# Patient Record
Sex: FEMALE | Race: WHITE | NOT HISPANIC OR LATINO | URBAN - METROPOLITAN AREA
[De-identification: names, ages, dates, MRNs, and addresses within clinical notes are randomized per-mention and may not be internally consistent; named-entity substitution may affect disease eponyms.]

---

## 2024-04-09 ENCOUNTER — EMERGENCY (EMERGENCY)
Facility: HOSPITAL | Age: 74
LOS: 1 days | Discharge: HOME | End: 2024-04-09
Attending: EMERGENCY MEDICINE | Admitting: EMERGENCY MEDICINE
Payer: MEDICARE

## 2024-04-09 VITALS
RESPIRATION RATE: 16 BRPM | HEART RATE: 96 BPM | SYSTOLIC BLOOD PRESSURE: 138 MMHG | TEMPERATURE: 98 F | OXYGEN SATURATION: 97 % | DIASTOLIC BLOOD PRESSURE: 81 MMHG

## 2024-04-09 VITALS
OXYGEN SATURATION: 95 % | HEIGHT: 62 IN | WEIGHT: 119.93 LBS | HEART RATE: 119 BPM | RESPIRATION RATE: 18 BRPM | DIASTOLIC BLOOD PRESSURE: 85 MMHG | SYSTOLIC BLOOD PRESSURE: 147 MMHG | TEMPERATURE: 98 F

## 2024-04-09 PROCEDURE — 73110 X-RAY EXAM OF WRIST: CPT

## 2024-04-09 PROCEDURE — 99285 EMERGENCY DEPT VISIT HI MDM: CPT | Mod: 25

## 2024-04-09 PROCEDURE — 73110 X-RAY EXAM OF WRIST: CPT | Mod: 26,LT

## 2024-04-09 PROCEDURE — 99285 EMERGENCY DEPT VISIT HI MDM: CPT

## 2024-04-09 PROCEDURE — 73100 X-RAY EXAM OF WRIST: CPT

## 2024-04-09 PROCEDURE — 73090 X-RAY EXAM OF FOREARM: CPT

## 2024-04-09 PROCEDURE — 73110 X-RAY EXAM OF WRIST: CPT | Mod: 26,LT,77

## 2024-04-09 PROCEDURE — 73090 X-RAY EXAM OF FOREARM: CPT | Mod: 26,LT

## 2024-04-09 PROCEDURE — 25605 CLTX DST RDL FX/EPHYS SEP W/: CPT | Mod: LT

## 2024-04-09 RX ORDER — OXYCODONE HYDROCHLORIDE 5 MG/1
5 TABLET ORAL ONCE
Refills: 0 | Status: DISCONTINUED | OUTPATIENT
Start: 2024-04-09 | End: 2024-04-09

## 2024-04-09 RX ORDER — IBUPROFEN 200 MG
400 TABLET ORAL ONCE
Refills: 0 | Status: COMPLETED | OUTPATIENT
Start: 2024-04-09 | End: 2024-04-09

## 2024-04-09 RX ORDER — OXYCODONE HYDROCHLORIDE 5 MG/1
1 TABLET ORAL
Qty: 12 | Refills: 0
Start: 2024-04-09 | End: 2024-04-11

## 2024-04-09 RX ADMIN — Medication 400 MILLIGRAM(S): at 14:47

## 2024-04-09 RX ADMIN — OXYCODONE HYDROCHLORIDE 5 MILLIGRAM(S): 5 TABLET ORAL at 16:16

## 2024-04-09 NOTE — ED PROVIDER NOTE - NSFOLLOWUPINSTRUCTIONS_ED_ALL_ED_FT
Follow-up with orthopedics, call to make an appointment.  May take over-the-counter Tylenol 1000 mg orally every 4-6 hours as needed for pain.  May also take ibuprofen 400 mg every 4-6 hours as needed for pain.  If having severe pain may take oxycodone 5 mg every 6 hours.  Return to the emergency department if having severe pain, increased swelling and or worsening of symptoms.

## 2024-04-09 NOTE — ED PROVIDER NOTE - CARE PROVIDERS DIRECT ADDRESSES
negative - no change in level of consciousness [email or web address]

## 2024-04-09 NOTE — ED PROVIDER NOTE - PRINCIPAL DIAGNOSIS
NURSING DISCHARGE NOTE    Discharged Home via Wheelchair. Accompanied by Support staff  Belongings Taken by patient/family. Discharge education complete. All questions answered.  IV removed Left wrist fracture

## 2024-04-09 NOTE — CONSULT NOTE ADULT - SUBJECTIVE AND OBJECTIVE BOX
Pt Name: JUNG CHANEL    MRN: [id number]    HPI: Patient is a 74y Female with left distal radius fracture s/p slip and fall at home on Sunday. Patient denies HS, LOC, fevers, numbness, tingling, or any other orthopedic complaints. Patient is right hand dominant and ambulates independently.     PAST MEDICAL & SURGICAL HISTORY:      Allergies: No Known Allergies      PHYSICAL EXAM:    Vital Signs Last 24 Hrs  T(C): 36.7 (09 Apr 2024 13:37), Max: 36.7 (09 Apr 2024 13:37)  T(F): 98 (09 Apr 2024 13:37), Max: 98 (09 Apr 2024 13:37)  HR: 119 (09 Apr 2024 13:37) (119 - 119)  BP: 147/85 (09 Apr 2024 13:37) (147/85 - 147/85)  BP(mean): --  RR: 18 (09 Apr 2024 13:37) (18 - 18)  SpO2: 95% (09 Apr 2024 13:37) (95% - 95%)    Parameters below as of 09 Apr 2024 13:37  Patient On (Oxygen Delivery Method): room air        Physical Exam:  General: NAD, Alert, Awake and oriented  Respiratory: Symmetric chest wall expansion bilaterally, no accessory muscle use      LEFT UE: No open skin. Obvious deformity at the wrist. +edema and ecchymosis to wrist and forearm. AIN/PIN/U intact, radial pulses intact, FROM elbow and shoulder.     Secondary Survey:   RLE/LLE/RUE: No TTP over bony prominences, SILT, palpable pulses, full/painless range of motion, compartments soft        Imaging: Xray Left wrist- left displaced and shortened distal radius fracture     Orthopedic A/P:    Pt is a  74y Female with left distal radius fracture     Procedure:  Under aseptic conditions, a hematoma block was administered to the fracture site using 10cc of 1% lidocaine. Closed reduction was performed and a well molded, well padded plaster splint was applied. The patient tolerated the procedure well and there we no complications. The patient's post-reduction neurvascular exam was unchanged. Post-reduction xrays demonstrated acceptable alignment.      -Pain control PRN  -NWB   -Keep splint/dressing clean and dry.   -ICE and elevate  -Do not remove dressing/splint until follow up in the office.   -N/V Checks to monitor for compartment signs  -Follow up with Melisa Borges or Silvia within 1 week. Please call the office to make an appointment.   -All imaging and physical exam discussed with Dr. Perez who is aware and approves of plan.

## 2024-04-09 NOTE — ED ADULT NURSE NOTE - OBJECTIVE STATEMENT
74y F from home c/o LT wrist pain s/p trip and fall 2 days ago. LT hand swollen, possible deformity, limited ROM to fingers/wrist. pt denies other pain/sx. pt denies head trauma, a&ox3, clear speech, respirations even/unlabored

## 2024-04-09 NOTE — ED PROVIDER NOTE - CARE PROVIDER_API CALL
Lalit Perez  Orthopaedic Surgery  [address] [place] [address]  Phone: [phone]  Fax: [phone]  Follow Up Time:    Larry Laurent  Surgery of the Hand  [address], [address]  [place] [address]  Phone: [phone]  Fax: [phone]  Follow Up Time:     Oscar Vincent  Orthopaedic Surgery  [address] [place] [address]  Phone: [phone]  Fax: [phone]  Follow Up Time:     Alejandro Vega  Orthopaedic Surgery  [address] [place] [id number]  Phone: [phone]  Fax: [phone]  Follow Up Time:

## 2024-04-09 NOTE — ED PROVIDER NOTE - OBJECTIVE STATEMENT
Discharge Instructions  PRE PROCEDURE INSTRUCTIONS    Stop all Non Steroidal Anti-inflammatory medication two days before your procedure. The list includes but is not limited to the following:    Ibuprofen (Advil, Motrin, Midol IB)   Aspirin, Aspirin containing products (Gibran, Ascriptin, Ecotrin, Bufferin),   Celecoxib (celebrex)  Naproxen (Aleve, Midol Extended Release, Naprosyn),   Ketorolac (Sprix)  Indomethacin (Indocin,)  Meloxicam (Mobic),   Diclofenac (Voltaren)    Stop ALL Aspirin and ALL NSAIDs two days before your procedure.    Nothing to eat or drink _6_ hours before your procedure.     NOTE: If you are diabetic, please do not take your insulin or diabetes medication as you will not be eating before your procedure.  IF YOUR BLOOD SUGAR CHANGES SIGNIFICANTLY FROM YOUR NORMAL LEVEL AFTER YOUR PROCEDURE CALL YOUR PRIMARY DOCTOR IMMEDIATELY.    Take your blood pressure/heart medication with a sip of water the morning of your procedure. Your procedure will be cancelled if your blood pressure is too high.    Shower with antibacterial soap (such as Dial) the morning of your procedure. Please do not use lotions, creams, powders or oils the day of your procedure. Remove all jewelry and piercings.    Call to cancel if you are ill or on antibiotics for ANY reason. [phone]    You MUST have a  accompany you to drive you home.  You CANNOT drive after your procedure.    If your  is not with you when you arrive for your procedure, YOUR PROCEDURE WILL BE CANCELLED.    The pain clinic phone number is [phone].        You will always get our voice mail when you call, please leave a message.   Please leave your name, date of birth and phone number on the voicemail to ensure we can identify you.    The pain clinic will return your call within 2 business days.              Blocks/Ablation Treatment for Pain    Your doctor has ordered Nerve Block and Radiofrequency Ablation to treat your pain.  Nerve  Blocks are a diagnostic tool used to establish the source of your pain.  Nerve Blocks are procedures performed prior to the Radiofrequency Ablation.        FIRST APPOINTMENT AND SECOND APPOINTMENT  Nerve Blocks are performed to diagnose the nerve causing the pain.  The doctor injects a numbing medicine (similar to the medication a dentist uses to stop pain) around the area of the nerve causing your pain.   The Nerve Block is a diagnostic injection, therefore it will only provide temporary pain relief, typically lasting one hour to a few days.   After the Nerve Block you are to make note of how much pain relief you have, not how long it lasts.  Ask Yourself these Questions  Do I have pain relief?  How much pain relief do I have? (If you have NO pain that would be 100% relief)  Am I able to do activities that before the block would cause pain?      The Nerve Block is repeated a second time, on another day,   to confirm the results.      THIRD APPOINTMENT  If the nerve blocks decrease your pain you will return on another day for Radiofrequency Ablation.  Radiofrequency Ablation (RFA) is a procedure which uses electrical current produced by radio waves to create a heat lesion on the nerve. This interrupts the nerve's ability to transmit pain, providing you with pain relief.  For this procedure you will receive sedation through and IV and stay longer after the procedure, typically 30 minutes to 1 hour.    An ice pack may be used intermittently to numb the pain and reduce swelling on the injection site. Ice packs must be used for 15 to 20 minutes at a time with a break of at least 30 minutes in between to avoid skin injury.      Pain relief after RFA is typically experienced 1 to 4 weeks after the injection. It is advised to rest for several days before returning to normal activities. Patients may engage in regular activities but should let pain levels be their guide for the first few days.     IF YOU HAVE PAIN ON THE  RIGHT AND LEFT SIDE YOU MAY HAVE MORE THAN 3 PROCEDURES.    YOUR DOCTOR OR NURSE WILL INFORM YOU OF THE TOTAL NUMBER OF PROCEDURES.        YOUR TREATMENT PLAN REQUIRES ________ PROCEDURE APPOINTMENTS    74 female PMH of hypothyroid, HLD, presents to the emergency department complaining of left wrist pain, patient states that 2 days ago, Sunday night she got up to go to the bathroom and tripped and fell landing on her left breast, causing pain and swelling, patient took aspirin with little to no relief.

## 2024-04-09 NOTE — ED ADULT NURSE NOTE - NEURO MENTATION
CERTIFICATE OF WORK    11/14/2022      Re:   Charly Arriola   [address] [facility] [place] [address]      This is to certify that Charly Arriola has been under my care from 11/14/2022 and can return to regular work on 11/16/22    RESTRICTIONS: None      REMARKS: None        SIGNATURE:___________________________________________,   11/14/2022      Curtis Starks MD          [facility] [place] [facility]  [address] [facility] [address] [facility]  [place] [id number] [phone]            
normal

## 2024-04-09 NOTE — ED PROVIDER NOTE - PATIENT PORTAL LINK FT
You can access the FollowMyHealth Patient Portal offered by [facility] by registering at the following website: [email or web address]. By joining [facility]’s FollowMyHealth portal, you will also be able to view your health information using other applications (apps) compatible with our system.

## 2024-04-09 NOTE — ED ADULT NURSE NOTE - NSFALLHARMRISKINTERV_ED_ALL_ED

## 2024-04-09 NOTE — ED ADULT TRIAGE NOTE - CHIEF COMPLAINT QUOTE
hurt left wrist s/p fall 2 days ago. didn't hit head. tachy to 120s. denies taking meds for pain today

## 2024-04-09 NOTE — ED ADULT NURSE NOTE - NS ED NOTE ABUSE RESPONSE YN
Return to the ED with any concerns - come back for inability to keep liquids or medicines down by mouth, worsening pain, trouble breathing or if you feel your child is worse in any way. Please follow-up with your doctor in 1-2 days. You can use 300mg motrin every 6 hours and 450mg tylenol every 6 hours.
Yes

## 2024-04-09 NOTE — ED PROVIDER NOTE - PROVIDER TOKENS
PROVIDER:[TOKEN:[[id number]]] PROVIDER:[TOKEN:[[id number]]],PROVIDER:[TOKEN:[[id number]]],PROVIDER:[TOKEN:[[id number]]]

## 2024-04-17 ENCOUNTER — APPOINTMENT (OUTPATIENT)
Dept: ORTHOPEDIC SURGERY | Facility: CLINIC | Age: 74
End: 2024-04-17
Payer: MEDICARE

## 2024-04-17 VITALS — HEIGHT: 62 IN | BODY MASS INDEX: 22.08 KG/M2 | WEIGHT: 120 LBS

## 2024-04-17 DIAGNOSIS — E78.00 PURE HYPERCHOLESTEROLEMIA, UNSPECIFIED: ICD-10-CM

## 2024-04-17 PROBLEM — Z00.00 ENCOUNTER FOR PREVENTIVE HEALTH EXAMINATION: Status: ACTIVE | Noted: 2024-04-17

## 2024-04-17 PROCEDURE — 25600 CLTX DST RDL FX/EPHYS SEP WO: CPT | Mod: LT

## 2024-04-17 PROCEDURE — 99204 OFFICE O/P NEW MOD 45 MIN: CPT | Mod: 57

## 2024-04-17 PROCEDURE — 73110 X-RAY EXAM OF WRIST: CPT | Mod: LT

## 2024-04-17 RX ORDER — ATORVASTATIN CALCIUM 80 MG/1
TABLET, FILM COATED ORAL
Refills: 0 | Status: ACTIVE | COMMUNITY

## 2024-04-17 NOTE — HISTORY OF PRESENT ILLNESS
[Dull/Aching] : dull/aching [Localized] : localized [Shooting] : shooting [de-identified] : 4/17/24:  Pt fell and injured her left wrist on 4/14/24.  Pt went to the hospital and the fracture was reduced and pt was put in long arm splint. [] : no [FreeTextEntry1] : LFT wrist [FreeTextEntry5] : LFT wrist fx from falling

## 2024-04-17 NOTE — ASSESSMENT
[FreeTextEntry1] : I discussed with the patient and the family that the nature of the fracture is one that would warrant surgery to restore length, alignment, and rotation. We discussed the r/b/a of surgical and nonsurgical treatment options.  The patient/family understand that without surgery they are at increased risk of arthritis, pain, loss of motion, loss of strength, ulnar loading/outer wrist pain and deformity.  The patient/family would like to avoid surgery understanding the risks. They understand that they can change their mind and I would be amenable to fixing the fracture primarily up to three weeks from the time of injury.  After that time, if they wanted fixation, I would let the fracture heal, see how they do, and perform malunion surgery if they are still interested.  A cast was applied.  The importance of ice and elevation were discussed with the patient.  The risks were also discussed such as compartment syndrome and skin breakdown.  They were instructed to never put foreign objects down the cast.  Patients should call for increasing pain, worsening swelling, numbness, extremity discoloration, or any other concerns.  We reviewed the importance of finger range of motion. We discussed that while wrist stiffness can be tolerated, finger stiffness causes grave dysfunction and is difficult to overcome once it sets in. The patient was encouraged to engage in finger range of motion exercises. A home exercise program was demonstrated and instructions given to begin immediately and to perform the exercises hourly.  F/u in 6 weeks for xrays out of cast

## 2024-04-17 NOTE — IMAGING
[de-identified] : left wrist: swelling/ecchymosis ttp over distal radius rom not assessed due to fracture nvid  outside NW xrays show displaced distal radius fracture

## 2024-04-29 RX ORDER — OXYCODONE AND ACETAMINOPHEN 5; 325 MG/1; MG/1
5-325 TABLET ORAL EVERY 6 HOURS
Qty: 20 | Refills: 0 | Status: ACTIVE | COMMUNITY
Start: 2024-04-17 | End: 1900-01-01

## 2024-05-08 ENCOUNTER — APPOINTMENT (OUTPATIENT)
Dept: ORTHOPEDIC SURGERY | Facility: CLINIC | Age: 74
End: 2024-05-08
Payer: MEDICARE

## 2024-05-08 VITALS — WEIGHT: 120 LBS | BODY MASS INDEX: 22.08 KG/M2 | HEIGHT: 62 IN

## 2024-05-08 PROCEDURE — 99024 POSTOP FOLLOW-UP VISIT: CPT

## 2024-05-08 PROCEDURE — 73110 X-RAY EXAM OF WRIST: CPT | Mod: LT

## 2024-05-08 RX ORDER — OXYCODONE AND ACETAMINOPHEN 5; 325 MG/1; MG/1
5-325 TABLET ORAL
Qty: 30 | Refills: 0 | Status: ACTIVE | COMMUNITY
Start: 2024-05-08 | End: 1900-01-01

## 2024-05-08 NOTE — ASSESSMENT
[FreeTextEntry1] :  pt will maintain cast for 3 more weeks and rto in that time for xray out of cast and examination.   Pt provided Percocet 5/325  qid prn pain x 5 days.

## 2024-05-08 NOTE — IMAGING
[de-identified] : left wrist: Pt comfortable in SAC. hand swelling is noted all digits are nvi with farom there is no skin irritation noted.  Left wrist 3 view xray shows: no change in fracture alignment.

## 2024-05-08 NOTE — HISTORY OF PRESENT ILLNESS
[Dull/Aching] : dull/aching [Localized] : localized [Shooting] : shooting [Throbbing] : throbbing [Tightness] : tightness [de-identified] : 5/8/2024: Pt here for 3 week f/u of a left DR fracture. Pt is comfortable in cast.   4/17/24:  Pt fell and injured her left wrist on 4/14/24.  Pt went to the hospital and the fracture was reduced and pt was put in long arm splint. [] : no [FreeTextEntry1] : LFT wrist [FreeTextEntry5] : LFT wrist fx from falling

## 2024-05-22 ENCOUNTER — APPOINTMENT (OUTPATIENT)
Dept: ORTHOPEDIC SURGERY | Facility: CLINIC | Age: 74
End: 2024-05-22

## 2024-05-22 PROCEDURE — 99024 POSTOP FOLLOW-UP VISIT: CPT

## 2024-05-22 RX ORDER — OXYCODONE AND ACETAMINOPHEN 5; 325 MG/1; MG/1
5-325 TABLET ORAL EVERY 6 HOURS
Qty: 20 | Refills: 0 | Status: ACTIVE | COMMUNITY
Start: 2024-05-22 | End: 1900-01-01

## 2024-05-22 NOTE — HISTORY OF PRESENT ILLNESS
[Dull/Aching] : dull/aching [Localized] : localized [Shooting] : shooting [Throbbing] : throbbing [Tightness] : tightness [de-identified] : 5/22/2024: pt here for f/u of a left DR fracture. Pt is requesting Percocet refill.   5/8/2024: Pt here for 3 week f/u of a left DR fracture. Pt is comfortable in cast.   4/17/24:  Pt fell and injured her left wrist on 4/14/24.  Pt went to the hospital and the fracture was reduced and pt was put in long arm splint. [] : no [FreeTextEntry1] : LFT wrist [FreeTextEntry5] : LFT wrist fx from falling

## 2024-05-22 NOTE — ASSESSMENT
[FreeTextEntry1] : pt will maintain cast for 2 more weeks and rto in that time for xray out of cast and examination.   Pt provided Percocet 5/325  qid prn pain x 5 days.

## 2024-05-22 NOTE — IMAGING
[de-identified] : left wrist: Pt mildly uncomfortable in SAC. hand swelling is noted all digits are nvi with farom there is no skin irritation noted.

## 2024-06-05 ENCOUNTER — RESULT CHARGE (OUTPATIENT)
Age: 74
End: 2024-06-05

## 2024-06-05 ENCOUNTER — APPOINTMENT (OUTPATIENT)
Dept: ORTHOPEDIC SURGERY | Facility: CLINIC | Age: 74
End: 2024-06-05
Payer: MEDICARE

## 2024-06-05 VITALS — HEIGHT: 62 IN | BODY MASS INDEX: 22.08 KG/M2 | WEIGHT: 120 LBS

## 2024-06-05 DIAGNOSIS — S52.572A OTHER INTRAARTICULAR FRACTURE OF LOWER END OF LEFT RADIUS, INITIAL ENCOUNTER FOR CLOSED FRACTURE: ICD-10-CM

## 2024-06-05 PROCEDURE — 99024 POSTOP FOLLOW-UP VISIT: CPT

## 2024-06-05 PROCEDURE — L3908: CPT

## 2024-06-05 RX ORDER — OXYCODONE AND ACETAMINOPHEN 5; 325 MG/1; MG/1
5-325 TABLET ORAL
Qty: 20 | Refills: 0 | Status: ACTIVE | COMMUNITY
Start: 2024-06-05 | End: 1900-01-01

## 2024-06-05 NOTE — ASSESSMENT
[FreeTextEntry1] : A brace was applied.  The importance of ice and elevation were discussed with the patient.  The risks were also discussed such as compartment syndrome and skin breakdown.  They were instructed to never put foreign objects down the brace.  Patients should call for increasing pain, worsening swelling, numbness, extremity discoloration, or any other concerns.  Start OT F/u in 6 weeks

## 2024-06-05 NOTE — HISTORY OF PRESENT ILLNESS
[Dull/Aching] : dull/aching [Localized] : localized [Shooting] : shooting [Throbbing] : throbbing [Tightness] : tightness [de-identified] : 6/5/24:  Pt reports that she has some shooting pain that keeps her from sleep.  5/22/2024: pt here for f/u of a left DR fracture. Pt is requesting Percocet refill.   5/8/2024: Pt here for 3 week f/u of a left DR fracture. Pt is comfortable in cast.   4/17/24:  Pt fell and injured her left wrist on 4/14/24.  Pt went to the hospital and the fracture was reduced and pt was put in long arm splint. [] : no [FreeTextEntry1] : LFT wrist [FreeTextEntry5] : LFT wrist fx from falling

## 2024-06-05 NOTE — IMAGING
[de-identified] : left wrist: no skin irritation ttp over DR stiffness nvid  xrays 3 views left wrist show distal radius fx in acceptable alignment with signs of healing

## 2024-08-07 ENCOUNTER — APPOINTMENT (OUTPATIENT)
Dept: ORTHOPEDIC SURGERY | Facility: CLINIC | Age: 74
End: 2024-08-07

## 2025-07-18 NOTE — ED PROVIDER NOTE - NSTIMEPROVIDERCAREINITIATE_GEN_ER
09-Apr-2024 14:15 Patient is AAOx4. Patient given medications are ordered per MAR. Blood Glucose monitoring in place. PRN Dilaudid given for pain. Cardiac monitoring in place. Safety maintained. Bed alarm set.